# Patient Record
Sex: FEMALE | Race: WHITE | Employment: STUDENT | URBAN - NONMETROPOLITAN AREA
[De-identification: names, ages, dates, MRNs, and addresses within clinical notes are randomized per-mention and may not be internally consistent; named-entity substitution may affect disease eponyms.]

---
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
## 2024-01-11 ENCOUNTER — OFFICE VISIT (OUTPATIENT)
Dept: PRIMARY CARE CLINIC | Age: 19
End: 2024-01-11
Payer: COMMERCIAL

## 2024-01-11 VITALS
WEIGHT: 183 LBS | BODY MASS INDEX: 27.74 KG/M2 | DIASTOLIC BLOOD PRESSURE: 68 MMHG | SYSTOLIC BLOOD PRESSURE: 100 MMHG | OXYGEN SATURATION: 100 % | HEART RATE: 76 BPM | HEIGHT: 68 IN

## 2024-01-11 DIAGNOSIS — J45.41 MODERATE PERSISTENT ASTHMA WITH ACUTE EXACERBATION: Primary | ICD-10-CM

## 2024-01-11 PROCEDURE — 99213 OFFICE O/P EST LOW 20 MIN: CPT | Performed by: FAMILY MEDICINE

## 2024-01-11 RX ORDER — AMITRIPTYLINE HYDROCHLORIDE 10 MG/1
TABLET, FILM COATED ORAL
COMMUNITY

## 2024-01-11 RX ORDER — SUMATRIPTAN 50 MG/1
TABLET, FILM COATED ORAL
COMMUNITY
Start: 2024-01-05

## 2024-01-11 NOTE — PROGRESS NOTES
[facility]      Patient:  Queenie Iqbal 18 y.o. female     Date of Service: 12/11/23      Chief Complaint:   Chief Complaint   Patient presents with    Asthma         History of Present Illness     Following up on previous concerns of chest pain/asthma ongoing for the past several weeks prior to last eval.    Was started on Symbicort for controller and as needed use.  Was also treated for asthma exacerbation.  Symptoms are much improved with treatment at that time, did sustain a concussion and has been out of all physical activity and sports for the past few weeks.  Is currently engaged in the concussion protocol with school.  Allergies:    Patient has no known allergies.    Medication List:    Current Outpatient Medications   Medication Sig Dispense Refill    amitriptyline (ELAVIL) 10 MG tablet 1 tablet at bedtime Orally Once a day for 30 days      SUMAtriptan (IMITREX) 50 MG tablet TAKE 1 TABLET BY MOUTH TWICE DAILY AS NEEDED, AT LEAST 2 HOURS BETWEEN DOSES, FOR 5 DAYS.      budesonide-formoterol (SYMBICORT) 160-4.5 MCG/ACT AERO Inhale 2 puffs into the lungs 2 times daily 10.2 g 3    Spacer/Aero-Holding Chambers CARI 1 Device by Does not apply route daily as needed (Asthma) 1 each 0    albuterol sulfate HFA (PROVENTIL;VENTOLIN;PROAIR) 108 (90 Base) MCG/ACT inhaler        No current facility-administered medications for this visit.          Review of Systems   Respiratory:  + for cough, shortness of breath and wheezing  Physical Exam   Physical Exam  Constitutional:       Appearance: Well-developed.   HENT:      Head: Normocephalic.      Right Ear: External ear normal.      Left Ear: External ear normal.   Cardiovascular:      Rate and Rhythm: Normal rate and regular rhythm.      Heart sounds: Normal heart sounds. No murmur heard.  Pulmonary:      Effort: Pulmonary effort is normal.   Good air exchange  Abdominal:      Palpations: Abdomen is soft.      Tenderness: There is no abdominal

## 2024-01-23 ENCOUNTER — HOSPITAL ENCOUNTER (OUTPATIENT)
Dept: MRI IMAGING | Age: 19
Discharge: HOME | End: 2024-01-25
Payer: COMMERCIAL

## 2024-01-23 DIAGNOSIS — G43.519 INTRACTABLE PERSISTENT MIGRAINE AURA WITHOUT CEREBRAL INFARCTION AND WITHOUT STATUS MIGRAINOSUS: ICD-10-CM

## 2024-01-23 DIAGNOSIS — S06.0X0D CONCUSSION WITHOUT LOSS OF CONSCIOUSNESS, SUBSEQUENT ENCOUNTER: ICD-10-CM

## 2024-01-23 PROCEDURE — 70551 MRI BRAIN STEM W/O DYE: CPT

## 2024-01-25 ENCOUNTER — OFFICE VISIT (OUTPATIENT)
Dept: NEUROLOGY | Age: 19
End: 2024-01-25
Payer: COMMERCIAL

## 2024-01-25 VITALS
HEIGHT: 68 IN | BODY MASS INDEX: 27.43 KG/M2 | HEART RATE: 87 BPM | WEIGHT: 181 LBS | DIASTOLIC BLOOD PRESSURE: 71 MMHG | SYSTOLIC BLOOD PRESSURE: 106 MMHG

## 2024-01-25 DIAGNOSIS — G44.309 POST-CONCUSSION HEADACHE: Primary | ICD-10-CM

## 2024-01-25 PROCEDURE — 99204 OFFICE O/P NEW MOD 45 MIN: CPT | Performed by: PSYCHIATRY & NEUROLOGY

## 2024-01-25 RX ORDER — PREDNISONE 10 MG/1
10 TABLET ORAL DAILY
COMMUNITY

## 2024-01-25 RX ORDER — RIMEGEPANT SULFATE 75 MG/75MG
75 TABLET, ORALLY DISINTEGRATING ORAL PRN
Qty: 30 TABLET | Refills: 1 | Status: SHIPPED | OUTPATIENT
Start: 2024-01-25 | End: 2024-02-24

## 2024-01-25 ASSESSMENT — ENCOUNTER SYMPTOMS
PHOTOPHOBIA: 0
NAUSEA: 0
CHEST TIGHTNESS: 0
SHORTNESS OF BREATH: 0
WHEEZING: 0
VOMITING: 0
TROUBLE SWALLOWING: 0
VOICE CHANGE: 0
COLOR CHANGE: 0

## 2024-01-25 NOTE — PROGRESS NOTES
[address] [facility] [address]  [place] [address]  P: [phone]  F: [phone]    NEUROLOGY CLINIC NOTE     PATIENT NAME: Queenie Iqbal  PATIENT MRN: [id number]  PRIMARY CARE PHYSICIAN: Laurel Lopez MD    HPI:      Queenie Iqbal is a 18 y.o. with past medical history of left-sided hemiparetic migraines presents for establishing care for migraines.    Patient's migraines started in 2018, and occurred infrequently approximately 2-3 times per year.  However, in 2019 she had a span of 3-4 migraine episodes over 3 to 4 months.  At that time, she was evaluated by neurology team in [place] who diagnosed her with hemiparetic migraine with left-sided symptoms, and can be treated with ibuprofen and electrolytes.  Since then, patient had migraine occurrence in 2020 which was similar to prior episodes.  She then experienced 2 concussions in 2023, in July and December.  Patient lost consciousness during the concussion in December for approximately 1 second.  Both concussion episodes were related to wrestling.  Her migraines have been worsening since that point, and states she has had a persistent headache since the concussion in December 2023.      Her migraines are described to occur in temporal regions and midline of the skull, rated at 8-10 out of 10.  She has associated symptoms of visual changes described as \"cracked mirror appearance\", double vision, blurry vision, dysarthria, left-sided facial droop, and left-sided hemiparesis.  She also has nausea and vomiting associated with migraines.  The migraines typically lasted 20 to 30 minutes previously, and are now lasting multiple hours.  She has tried sumatriptan recently, however it made the headaches worse.  She has not tried Elavil.  Her most recent migraine episodes occurred on January 6th and 17th, 2024.    MRI brain without IV contrast done on January 2024 was unremarkable.    History obtained from 
    [facility]  [address], [address]  [place] [address]  Ph: [phone] or [phone]  FAX: [phone]    Chief Complaint: Headaches     Dear Laurel Lopez MD     I had the pleasure of seeing your patient today in neurology consultation for her symptoms. As you would recall Queenie Iqbal is a 18 y.o. female.      Past Medical History:   Diagnosis Date    Asthma      No past surgical history on file.  No Known Allergies  No family history on file.   Social History     Socioeconomic History    Marital status: Single     Spouse name: Not on file    Number of children: Not on file    Years of education: Not on file    Highest education level: Not on file   Occupational History    Not on file   Tobacco Use    Smoking status: Never    Smokeless tobacco: Never   Vaping Use    Vaping Use: Never used   Substance and Sexual Activity    Alcohol use: Never    Drug use: Never    Sexual activity: Not on file   Other Topics Concern    Not on file   Social History Narrative    Not on file     Social Determinants of Health     Financial Resource Strain: Low Risk  (12/11/2023)    Overall Financial Resource Strain (CARDIA)     Difficulty of Paying Living Expenses: Not hard at all   Food Insecurity: No Food Insecurity (12/11/2023)    Hunger Vital Sign     Worried About Running Out of Food in the Last Year: Never true     Ran Out of Food in the Last Year: Never true   Transportation Needs: Unknown (12/11/2023)    PRAPARE - Transportation     Lack of Transportation (Medical): Not on file     Lack of Transportation (Non-Medical): No   Physical Activity: Not on file   Stress: Not on file   Social Connections: Not on file   Intimate Partner Violence: Not on file   Housing Stability: Unknown (12/11/2023)    Housing Stability Vital Sign     Unable to Pay for Housing in the Last Year: Not on file     Number of Places Lived in the Last Year: Not on file     Unstable Housing in the Last Year: No 
Yes

## 2025-01-23 ENCOUNTER — OFFICE VISIT NO LOS (OUTPATIENT)
Dept: SPORTS MEDICINE | Facility: CLINIC | Age: 20
End: 2025-01-23

## 2025-01-23 DIAGNOSIS — M25.512 ACUTE PAIN OF LEFT SHOULDER: Primary | ICD-10-CM

## 2025-01-24 NOTE — PROGRESS NOTES
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 She described an awkward landing that resulted in immediate, severe pain and limited range of motion in the left arm. The patient also noted that her fingers turned purple following the incident.    This is not the first time the patient has experienced a similar issue. She reported a previous strain to the nerves in the collarbone area during the summer, which was managed conservatively with rest and avoidance of strenuous physical activity.    The current pain is localized to the top of the shoulder, near where the collarbone connects to the shoulder. The patient reported that the range of motion in the shoulder has improved significantly over the past week, although the area still feels tight.    In terms of muscle strength, the patient reported some improvement but still experiences discomfort when carrying heavy objects. Despite this, she is able to carry small items without significant pain.    The patient was initially using a sling for support, but reported that it was causing more discomfort in the elbow and arm than relief. She expressed a desire to discontinue the use of the sling.    The patient's condition has been improving since the incident, and she is currently undergoing rehabilitation. She is not yet ready to return to wrestling.         PMH, Medications and Allergies were reviewed and updated as needed.      ROS:  As noted above otherwise negative.    There is no problem list on file for this patient.      No current outpatient medications on file.     No current facility-administered medications for this visit.            OBJECTIVE       Vitals: There were no vitals filed for this visit.  BMI: There is no height or weight on file to calculate  BMI.      Imaging Results    RADIOLOGY  Shoulder X-ray: Negative for clavicle fracture (01/17/2025)               ASSESSMENT & PLAN     Assessment/Plan     Left Shoulder Injury  Likely strain or sprain based on history and physical exam. No evidence of fracture. Pain localized to the outer third of the collarbone. Improved range of motion and muscle strength since the injury occurred a week ago.  -Discontinue use of sling as it's causing more discomfort.  -Continue with current rehabilitation plan.  -Reevaluate on Monday for potential return to wrestling competition.    Future Wrestling Competitions  Discussed potential return to competition next week. Decision will be based on further assessment of range of motion and muscle strength.  -Discuss potential return to competition at next appointment.          Diagnosis Plan   1. Acute pain of left shoulder             Return to clinic on monday for follow up. Return sooner if develops new or worsening symptoms.    Options for treatment and/or follow-up care were reviewed with the patient was actively involved in the decision making process. Patient verbalized understanding and was in agreement with the plan and have no further questions at this time.      Dr. Fede Fitch,     Primary Care Sports Medicine  [facility]  [phone]

## 2025-01-27 ENCOUNTER — OFFICE VISIT NO LOS (OUTPATIENT)
Dept: SPORTS MEDICINE | Facility: CLINIC | Age: 20
End: 2025-01-27

## 2025-01-27 DIAGNOSIS — M25.512 ACUTE PAIN OF LEFT SHOULDER: Primary | ICD-10-CM

## 2025-01-27 RX ORDER — MELOXICAM 15 MG/1
15 TABLET ORAL DAILY
Qty: 30 TABLET | Refills: 0 | Status: SHIPPED | OUTPATIENT
Start: 2025-01-27 | End: 2025-02-26

## 2025-01-27 RX ORDER — PREDNISONE 50 MG/1
TABLET ORAL
Qty: 5 TABLET | Refills: 0 | Status: SHIPPED | OUTPATIENT
Start: 2025-01-27

## 2025-01-27 RX ORDER — CYCLOBENZAPRINE HCL 10 MG
10 TABLET ORAL NIGHTLY PRN
Qty: 15 TABLET | Refills: 0 | Status: SHIPPED | OUTPATIENT
Start: 2025-01-27 | End: 2025-02-06

## 2025-01-28 NOTE — PROGRESS NOTES
Replaced by [facility]  Sports Medicine Clinic         SUBJECTIVE     History of Present Illness    Tanika, an active individual with a history of shoulder injury, presents with a recurrent shoulder issue. They report a recent incident where they landed awkwardly on the same shoulder, causing discomfort and limited range of motion. The pain is localized to the shoulder, with no radiation. They deny any significant injury from the incident.    The patient has been managing the discomfort with light stretching, band exercises, and yoga, but reports difficulty with weight-bearing activities. They have also been taking Tylenol and ibuprofen for pain management. Despite these interventions, they still experience limitations in certain movements, particularly in lifting the arm upwards and backwards.    The patient has not engaged in any contact sports since the incident and has been cautious about not exacerbating the injury. They report a significant improvement in their shoulder condition compared to a week ago. However, they still experience some discomfort and tightness during certain movements.    The patient has not been using any additional treatments such as cupping or muscle relaxants. They report a slight burning sensation in the shoulder, which they have been managing with over-the-counter pain medications.         PMH, Medications and Allergies were reviewed and updated as needed.      ROS:  As noted above otherwise negative.    There is no problem list on file for this patient.      Current Outpatient Medications   Medication Sig Dispense Refill    predniSONE 50 mg tablet Take one pill by mouth daily for 5 days. 5 tablet 0    meloxicam (Mobic) 15 mg tablet Take 1 tablet (15 mg total) by mouth daily Start taking 24 hours after finishing the prednisone burst. 30 tablet 0    cyclobenzaprine (FLEXERIL) 10 mg tablet Take 1 tablet (10 mg total) by mouth nightly as needed for muscle  spasms for up to 10 days 15 tablet 0     No current facility-administered medications for this visit.            OBJECTIVE       Vitals: There were no vitals filed for this visit.  BMI: There is no height or weight on file to calculate BMI.    Gen: Well nourished and in no acute distress  HEENT: Extraocular movement intact  Neck: Supple  Pulm: Breathing Comfortably. No increased respiratory effort.  Psych: Euthymic. Appropriately answers questions    MSK:  Shoulder: left  Inspection: no erythema, effusion, or bony deformity  Palp: TTP along the clavicle, ac joint, and biceps tendon  ROM: Abduction and flexion to 90 degrees due to pain. ER to 20 degees, and IR to T12.  MMT: 5/5 strength in all planes of motion w/ pain or restriction  RTC: - Helms, - Neer, + empty can, - resisted ER/IR, + lift off  ACJ: - ACJ TTP, - scarf testing, - AC provocation  Bicep: + speeds test  Labral: - obriens  Neurovasc: sensation intact throughout affected arm, 2 sec cap refill      Imaging   Results                    ASSESSMENT & PLAN     Assessment/Plan     Shoulder Injury  Pain and limited range of motion in the shoulder, likely due to a nerve injury. Improvement noted since last week. Strength is preserved but pain is elicited with certain movements. Tenderness noted over the AC joint and posterior shoulder.  -Start Prednisone 1 pill daily for 5 days to reduce inflammation and nerve pain.  -Discontinue Ibuprofen while on Prednisone.  -Start a meloxicam after 1 day of Prednisone.  -Consider adding a muscle relaxant if needed.  -Continue with light stretching and rehab exercises with bands.  -Avoid contact sports and any painful movements until full range of motion is restored and pain is resolved.  -Check back in 1 week.          Diagnosis Plan   1. Acute pain of left shoulder  predniSONE 50 mg tablet    meloxicam (Mobic) 15 mg tablet    cyclobenzaprine (FLEXERIL) 10 mg tablet           Return to clinic in 1 week for follow up.  Return sooner if develops new or worsening symptoms.    Options for treatment and/or follow-up care were reviewed with the patient was actively involved in the decision making process. Patient verbalized understanding and was in agreement with the plan and have no further questions at this time.      Dr. Fede Fitch,     Primary Care Sports Medicine  [facility]  [phone]